# Patient Record
Sex: FEMALE | Race: WHITE | Employment: FULL TIME
[De-identification: names, ages, dates, MRNs, and addresses within clinical notes are randomized per-mention and may not be internally consistent; named-entity substitution may affect disease eponyms.]

---

## 2023-06-19 RX ORDER — ACETAMINOPHEN 160 MG
1 TABLET,DISINTEGRATING ORAL DAILY
COMMUNITY

## 2023-07-03 ENCOUNTER — HOSPITAL ENCOUNTER (OUTPATIENT)
Facility: HOSPITAL | Age: 63
Discharge: HOME | End: 2023-07-06
Payer: COMMERCIAL

## 2023-07-03 VITALS
DIASTOLIC BLOOD PRESSURE: 86 MMHG | HEART RATE: 92 BPM | SYSTOLIC BLOOD PRESSURE: 133 MMHG | TEMPERATURE: 98.1 F | RESPIRATION RATE: 18 BRPM | OXYGEN SATURATION: 99 %

## 2023-07-03 LAB
ABO + RH BLD: NORMAL
APPEARANCE UR: CLEAR
BACTERIA URNS QL MICRO: NEGATIVE /HPF
BILIRUB UR QL: NEGATIVE
BLOOD GROUP ANTIBODIES SERPL: NORMAL
COLOR UR: NORMAL
EPITH CASTS URNS QL MICRO: NORMAL /LPF
ERYTHROCYTE [DISTWIDTH] IN BLOOD BY AUTOMATED COUNT: 13.7 % (ref 11.5–14.5)
GLUCOSE UR STRIP.AUTO-MCNC: NEGATIVE MG/DL
HCT VFR BLD AUTO: 43.2 % (ref 35–47)
HGB BLD-MCNC: 14.2 G/DL (ref 11.5–16)
HGB UR QL STRIP: NEGATIVE
HYALINE CASTS URNS QL MICRO: NORMAL /LPF (ref 0–2)
KETONES UR QL STRIP.AUTO: NEGATIVE MG/DL
LEUKOCYTE ESTERASE UR QL STRIP.AUTO: NEGATIVE
MCH RBC QN AUTO: 26.3 PG (ref 26–34)
MCHC RBC AUTO-ENTMCNC: 32.9 G/DL (ref 30–36.5)
MCV RBC AUTO: 80 FL (ref 80–99)
NITRITE UR QL STRIP.AUTO: NEGATIVE
NRBC # BLD: 0 K/UL (ref 0–0.01)
NRBC BLD-RTO: 0 PER 100 WBC
PH UR STRIP: 5.5 (ref 5–8)
PLATELET # BLD AUTO: 275 K/UL (ref 150–400)
PMV BLD AUTO: 10.3 FL (ref 8.9–12.9)
PROT UR STRIP-MCNC: NEGATIVE MG/DL
RBC # BLD AUTO: 5.4 M/UL (ref 3.8–5.2)
RBC #/AREA URNS HPF: NORMAL /HPF (ref 0–5)
SP GR UR REFRACTOMETRY: 1.01
SPECIMEN EXP DATE BLD: NORMAL
URINE CULTURE IF INDICATED: NORMAL
UROBILINOGEN UR QL STRIP.AUTO: 0.2 EU/DL (ref 0.2–1)
WBC # BLD AUTO: 14.1 K/UL (ref 3.6–11)
WBC URNS QL MICRO: NORMAL /HPF (ref 0–4)

## 2023-07-03 PROCEDURE — 81001 URINALYSIS AUTO W/SCOPE: CPT

## 2023-07-03 PROCEDURE — 85027 COMPLETE CBC AUTOMATED: CPT

## 2023-07-03 PROCEDURE — 86900 BLOOD TYPING SEROLOGIC ABO: CPT

## 2023-07-03 PROCEDURE — 86901 BLOOD TYPING SEROLOGIC RH(D): CPT

## 2023-07-03 PROCEDURE — 86850 RBC ANTIBODY SCREEN: CPT

## 2023-07-03 PROCEDURE — 36415 COLL VENOUS BLD VENIPUNCTURE: CPT

## 2023-07-03 RX ORDER — ROSUVASTATIN CALCIUM 20 MG/1
20 TABLET, COATED ORAL DAILY
COMMUNITY

## 2023-07-03 ASSESSMENT — PAIN SCALES - GENERAL: PAINLEVEL_OUTOF10: 0

## 2023-07-03 NOTE — PERIOP NOTE
Pt. Chloe Teixeira in for PAT testing, brought medication list and updated Home med list.    Patient unable to give enough specimen for UA with Reflex Culture, provided oral fluids. 1205pm: Able to obtain urine specimen, sent to lab.

## 2023-07-05 NOTE — PERIOP NOTE
Call received from [facility] from Dr. Robin Reyes office and as per same,, Dr. Allyson Gibson said on her notes \" she will send urine  culture during surgery. \"

## 2023-07-06 ENCOUNTER — ANESTHESIA EVENT (OUTPATIENT)
Facility: HOSPITAL | Age: 63
End: 2023-07-06
Payer: COMMERCIAL

## 2023-07-07 ENCOUNTER — HOSPITAL ENCOUNTER (OUTPATIENT)
Facility: HOSPITAL | Age: 63
Discharge: HOME | End: 2023-07-07
Attending: OBSTETRICS & GYNECOLOGY | Admitting: OBSTETRICS & GYNECOLOGY
Payer: COMMERCIAL

## 2023-07-07 ENCOUNTER — ANESTHESIA (OUTPATIENT)
Facility: HOSPITAL | Age: 63
End: 2023-07-07
Payer: COMMERCIAL

## 2023-07-07 VITALS
DIASTOLIC BLOOD PRESSURE: 57 MMHG | TEMPERATURE: 97.2 F | BODY MASS INDEX: 33.82 KG/M2 | RESPIRATION RATE: 16 BRPM | HEIGHT: 65 IN | HEART RATE: 64 BPM | OXYGEN SATURATION: 98 % | WEIGHT: 203 LBS | SYSTOLIC BLOOD PRESSURE: 96 MMHG

## 2023-07-07 DIAGNOSIS — G89.18 POST-OP PAIN: Primary | ICD-10-CM

## 2023-07-07 PROBLEM — Z87.42 STATUS POST CERVICAL POLYP REMOVAL: Status: ACTIVE | Noted: 2023-07-07

## 2023-07-07 PROBLEM — D21.9 FIBROIDS: Status: ACTIVE | Noted: 2023-07-07

## 2023-07-07 PROBLEM — N95.0 POST-MENOPAUSAL BLEEDING: Status: RESOLVED | Noted: 2023-07-07 | Resolved: 2023-07-07

## 2023-07-07 PROBLEM — D21.9 FIBROIDS: Status: RESOLVED | Noted: 2023-07-07 | Resolved: 2023-07-07

## 2023-07-07 PROBLEM — Z87.42 STATUS POST CERVICAL POLYP REMOVAL: Status: RESOLVED | Noted: 2023-07-07 | Resolved: 2023-07-07

## 2023-07-07 PROBLEM — Z98.890 STATUS POST CERVICAL POLYP REMOVAL: Status: RESOLVED | Noted: 2023-07-07 | Resolved: 2023-07-07

## 2023-07-07 PROBLEM — Z98.890 STATUS POST CERVICAL POLYP REMOVAL: Status: ACTIVE | Noted: 2023-07-07

## 2023-07-07 PROBLEM — N95.0 POST-MENOPAUSAL BLEEDING: Status: ACTIVE | Noted: 2023-07-07

## 2023-07-07 PROBLEM — R32 URINARY INCONTINENCE: Status: ACTIVE | Noted: 2023-07-07

## 2023-07-07 PROCEDURE — 3600000004 HC SURGERY LEVEL 4 BASE: Performed by: OBSTETRICS & GYNECOLOGY

## 2023-07-07 PROCEDURE — 2580000003 HC RX 258: Performed by: ANESTHESIOLOGY

## 2023-07-07 PROCEDURE — 87086 URINE CULTURE/COLONY COUNT: CPT

## 2023-07-07 PROCEDURE — 7100000000 HC PACU RECOVERY - FIRST 15 MIN: Performed by: OBSTETRICS & GYNECOLOGY

## 2023-07-07 PROCEDURE — 3700000000 HC ANESTHESIA ATTENDED CARE: Performed by: OBSTETRICS & GYNECOLOGY

## 2023-07-07 PROCEDURE — 7100000010 HC PHASE II RECOVERY - FIRST 15 MIN: Performed by: OBSTETRICS & GYNECOLOGY

## 2023-07-07 PROCEDURE — 2720000010 HC SURG SUPPLY STERILE: Performed by: OBSTETRICS & GYNECOLOGY

## 2023-07-07 PROCEDURE — 6360000002 HC RX W HCPCS: Performed by: ANESTHESIOLOGY

## 2023-07-07 PROCEDURE — 7100000001 HC PACU RECOVERY - ADDTL 15 MIN: Performed by: OBSTETRICS & GYNECOLOGY

## 2023-07-07 PROCEDURE — 88305 TISSUE EXAM BY PATHOLOGIST: CPT

## 2023-07-07 PROCEDURE — 7100000011 HC PHASE II RECOVERY - ADDTL 15 MIN: Performed by: OBSTETRICS & GYNECOLOGY

## 2023-07-07 PROCEDURE — 2709999900 HC NON-CHARGEABLE SUPPLY: Performed by: OBSTETRICS & GYNECOLOGY

## 2023-07-07 PROCEDURE — 6360000002 HC RX W HCPCS

## 2023-07-07 PROCEDURE — 6360000002 HC RX W HCPCS: Performed by: OBSTETRICS & GYNECOLOGY

## 2023-07-07 PROCEDURE — 3600000014 HC SURGERY LEVEL 4 ADDTL 15MIN: Performed by: OBSTETRICS & GYNECOLOGY

## 2023-07-07 PROCEDURE — 6370000000 HC RX 637 (ALT 250 FOR IP)

## 2023-07-07 PROCEDURE — 3700000001 HC ADD 15 MINUTES (ANESTHESIA): Performed by: OBSTETRICS & GYNECOLOGY

## 2023-07-07 PROCEDURE — 6360000002 HC RX W HCPCS: Performed by: NURSE ANESTHETIST, CERTIFIED REGISTERED

## 2023-07-07 PROCEDURE — P9045 ALBUMIN (HUMAN), 5%, 250 ML: HCPCS

## 2023-07-07 PROCEDURE — 2500000003 HC RX 250 WO HCPCS: Performed by: NURSE ANESTHETIST, CERTIFIED REGISTERED

## 2023-07-07 PROCEDURE — 2580000003 HC RX 258: Performed by: OBSTETRICS & GYNECOLOGY

## 2023-07-07 PROCEDURE — 88331 PATH CONSLTJ SURG 1 BLK 1SPC: CPT

## 2023-07-07 PROCEDURE — 88342 IMHCHEM/IMCYTCHM 1ST ANTB: CPT

## 2023-07-07 PROCEDURE — 88307 TISSUE EXAM BY PATHOLOGIST: CPT

## 2023-07-07 PROCEDURE — 6370000000 HC RX 637 (ALT 250 FOR IP): Performed by: OBSTETRICS & GYNECOLOGY

## 2023-07-07 RX ORDER — LIDOCAINE HYDROCHLORIDE 20 MG/ML
INJECTION, SOLUTION EPIDURAL; INFILTRATION; INTRACAUDAL; PERINEURAL PRN
Status: DISCONTINUED | OUTPATIENT
Start: 2023-07-07 | End: 2023-07-07 | Stop reason: SDUPTHER

## 2023-07-07 RX ORDER — CEFAZOLIN SODIUM 1 G/3ML
INJECTION, POWDER, FOR SOLUTION INTRAMUSCULAR; INTRAVENOUS
Status: DISCONTINUED
Start: 2023-07-07 | End: 2023-07-07 | Stop reason: HOSPADM

## 2023-07-07 RX ORDER — HYDROMORPHONE HCL 110MG/55ML
PATIENT CONTROLLED ANALGESIA SYRINGE INTRAVENOUS PRN
Status: DISCONTINUED | OUTPATIENT
Start: 2023-07-07 | End: 2023-07-07 | Stop reason: SDUPTHER

## 2023-07-07 RX ORDER — METRONIDAZOLE 500 MG/100ML
500 INJECTION, SOLUTION INTRAVENOUS ONCE
Status: COMPLETED | OUTPATIENT
Start: 2023-07-07 | End: 2023-07-07

## 2023-07-07 RX ORDER — ALBUMIN, HUMAN INJ 5% 5 %
12.5 SOLUTION INTRAVENOUS ONCE
Status: COMPLETED | OUTPATIENT
Start: 2023-07-07 | End: 2023-07-07

## 2023-07-07 RX ORDER — DROPERIDOL 2.5 MG/ML
0.62 INJECTION, SOLUTION INTRAMUSCULAR; INTRAVENOUS
Status: DISCONTINUED | OUTPATIENT
Start: 2023-07-07 | End: 2023-07-07 | Stop reason: HOSPADM

## 2023-07-07 RX ORDER — MEPERIDINE HYDROCHLORIDE 25 MG/ML
12.5 INJECTION INTRAMUSCULAR; INTRAVENOUS; SUBCUTANEOUS EVERY 5 MIN PRN
Status: DISCONTINUED | OUTPATIENT
Start: 2023-07-07 | End: 2023-07-07 | Stop reason: HOSPADM

## 2023-07-07 RX ORDER — METRONIDAZOLE 500 MG/100ML
INJECTION, SOLUTION INTRAVENOUS
Status: COMPLETED
Start: 2023-07-07 | End: 2023-07-07

## 2023-07-07 RX ORDER — KETOROLAC TROMETHAMINE 30 MG/ML
30 INJECTION, SOLUTION INTRAMUSCULAR; INTRAVENOUS
Status: COMPLETED | OUTPATIENT
Start: 2023-07-07 | End: 2023-07-07

## 2023-07-07 RX ORDER — ACETAMINOPHEN 500 MG
1000 TABLET ORAL ONCE
Status: COMPLETED | OUTPATIENT
Start: 2023-07-07 | End: 2023-07-07

## 2023-07-07 RX ORDER — OXYCODONE HYDROCHLORIDE 5 MG/1
5 TABLET ORAL
Status: DISCONTINUED | OUTPATIENT
Start: 2023-07-07 | End: 2023-07-07 | Stop reason: HOSPADM

## 2023-07-07 RX ORDER — FENTANYL CITRATE 50 UG/ML
25 INJECTION, SOLUTION INTRAMUSCULAR; INTRAVENOUS EVERY 5 MIN PRN
Status: DISCONTINUED | OUTPATIENT
Start: 2023-07-07 | End: 2023-07-07 | Stop reason: HOSPADM

## 2023-07-07 RX ORDER — WATER 1000 ML/1000ML
INJECTION, SOLUTION INTRAVENOUS
Status: DISCONTINUED
Start: 2023-07-07 | End: 2023-07-07 | Stop reason: HOSPADM

## 2023-07-07 RX ORDER — HYDROMORPHONE HYDROCHLORIDE 1 MG/ML
0.5 INJECTION, SOLUTION INTRAMUSCULAR; INTRAVENOUS; SUBCUTANEOUS EVERY 5 MIN PRN
Status: DISCONTINUED | OUTPATIENT
Start: 2023-07-07 | End: 2023-07-07 | Stop reason: HOSPADM

## 2023-07-07 RX ORDER — ALBUMIN, HUMAN INJ 5% 5 %
SOLUTION INTRAVENOUS
Status: COMPLETED
Start: 2023-07-07 | End: 2023-07-07

## 2023-07-07 RX ORDER — ACETAMINOPHEN 325 MG/1
650 TABLET ORAL EVERY 4 HOURS PRN
Qty: 30 TABLET | Refills: 1 | Status: SHIPPED | OUTPATIENT
Start: 2023-07-07

## 2023-07-07 RX ORDER — PHENYLEPHRINE HCL IN 0.9% NACL 0.4MG/10ML
SYRINGE (ML) INTRAVENOUS PRN
Status: DISCONTINUED | OUTPATIENT
Start: 2023-07-07 | End: 2023-07-07 | Stop reason: SDUPTHER

## 2023-07-07 RX ORDER — OXYCODONE HYDROCHLORIDE 5 MG/1
5 TABLET ORAL EVERY 6 HOURS PRN
Qty: 12 TABLET | Refills: 0 | Status: SHIPPED | OUTPATIENT
Start: 2023-07-07 | End: 2023-07-10

## 2023-07-07 RX ORDER — DEXAMETHASONE SODIUM PHOSPHATE 4 MG/ML
INJECTION, SOLUTION INTRA-ARTICULAR; INTRALESIONAL; INTRAMUSCULAR; INTRAVENOUS; SOFT TISSUE PRN
Status: DISCONTINUED | OUTPATIENT
Start: 2023-07-07 | End: 2023-07-07 | Stop reason: SDUPTHER

## 2023-07-07 RX ORDER — SODIUM CHLORIDE 0.9 % (FLUSH) 0.9 %
5-40 SYRINGE (ML) INJECTION PRN
Status: DISCONTINUED | OUTPATIENT
Start: 2023-07-07 | End: 2023-07-07 | Stop reason: HOSPADM

## 2023-07-07 RX ORDER — IBUPROFEN 600 MG/1
600 TABLET ORAL 3 TIMES DAILY PRN
Qty: 90 TABLET | Refills: 0 | Status: SHIPPED | OUTPATIENT
Start: 2023-07-07

## 2023-07-07 RX ORDER — DIPHENHYDRAMINE HYDROCHLORIDE 50 MG/ML
12.5 INJECTION INTRAMUSCULAR; INTRAVENOUS
Status: DISCONTINUED | OUTPATIENT
Start: 2023-07-07 | End: 2023-07-07 | Stop reason: HOSPADM

## 2023-07-07 RX ORDER — EPHEDRINE SULFATE/0.9% NACL/PF 50 MG/5 ML
SYRINGE (ML) INTRAVENOUS PRN
Status: DISCONTINUED | OUTPATIENT
Start: 2023-07-07 | End: 2023-07-07 | Stop reason: SDUPTHER

## 2023-07-07 RX ORDER — LIDOCAINE HYDROCHLORIDE 10 MG/ML
1 INJECTION, SOLUTION EPIDURAL; INFILTRATION; INTRACAUDAL; PERINEURAL
Status: DISCONTINUED | OUTPATIENT
Start: 2023-07-07 | End: 2023-07-07 | Stop reason: HOSPADM

## 2023-07-07 RX ORDER — MIDAZOLAM HYDROCHLORIDE 1 MG/ML
INJECTION INTRAMUSCULAR; INTRAVENOUS PRN
Status: DISCONTINUED | OUTPATIENT
Start: 2023-07-07 | End: 2023-07-07 | Stop reason: SDUPTHER

## 2023-07-07 RX ORDER — SUCCINYLCHOLINE CHLORIDE 20 MG/ML
INJECTION INTRAMUSCULAR; INTRAVENOUS PRN
Status: DISCONTINUED | OUTPATIENT
Start: 2023-07-07 | End: 2023-07-07 | Stop reason: SDUPTHER

## 2023-07-07 RX ORDER — SODIUM CHLORIDE 9 MG/ML
INJECTION, SOLUTION INTRAVENOUS PRN
Status: DISCONTINUED | OUTPATIENT
Start: 2023-07-07 | End: 2023-07-07 | Stop reason: HOSPADM

## 2023-07-07 RX ORDER — BUPIVACAINE HYDROCHLORIDE 2.5 MG/ML
INJECTION, SOLUTION EPIDURAL; INFILTRATION; INTRACAUDAL PRN
Status: DISCONTINUED | OUTPATIENT
Start: 2023-07-07 | End: 2023-07-07 | Stop reason: ALTCHOICE

## 2023-07-07 RX ORDER — SODIUM CHLORIDE, SODIUM LACTATE, POTASSIUM CHLORIDE, CALCIUM CHLORIDE 600; 310; 30; 20 MG/100ML; MG/100ML; MG/100ML; MG/100ML
INJECTION, SOLUTION INTRAVENOUS CONTINUOUS
Status: DISCONTINUED | OUTPATIENT
Start: 2023-07-07 | End: 2023-07-07 | Stop reason: HOSPADM

## 2023-07-07 RX ORDER — ACETAMINOPHEN 500 MG
TABLET ORAL
Status: COMPLETED
Start: 2023-07-07 | End: 2023-07-07

## 2023-07-07 RX ORDER — FENTANYL CITRATE 50 UG/ML
INJECTION, SOLUTION INTRAMUSCULAR; INTRAVENOUS PRN
Status: DISCONTINUED | OUTPATIENT
Start: 2023-07-07 | End: 2023-07-07 | Stop reason: SDUPTHER

## 2023-07-07 RX ORDER — ROCURONIUM BROMIDE 10 MG/ML
INJECTION, SOLUTION INTRAVENOUS PRN
Status: DISCONTINUED | OUTPATIENT
Start: 2023-07-07 | End: 2023-07-07 | Stop reason: SDUPTHER

## 2023-07-07 RX ORDER — ONDANSETRON 2 MG/ML
INJECTION INTRAMUSCULAR; INTRAVENOUS PRN
Status: DISCONTINUED | OUTPATIENT
Start: 2023-07-07 | End: 2023-07-07 | Stop reason: SDUPTHER

## 2023-07-07 RX ORDER — DROPERIDOL 2.5 MG/ML
0.62 INJECTION, SOLUTION INTRAMUSCULAR; INTRAVENOUS
Status: COMPLETED | OUTPATIENT
Start: 2023-07-07 | End: 2023-07-07

## 2023-07-07 RX ORDER — ACETAMINOPHEN 325 MG/1
650 TABLET ORAL EVERY 4 HOURS PRN
Status: DISCONTINUED | OUTPATIENT
Start: 2023-07-07 | End: 2023-07-07 | Stop reason: HOSPADM

## 2023-07-07 RX ORDER — GLYCOPYRROLATE 0.2 MG/ML
INJECTION INTRAMUSCULAR; INTRAVENOUS PRN
Status: DISCONTINUED | OUTPATIENT
Start: 2023-07-07 | End: 2023-07-07 | Stop reason: SDUPTHER

## 2023-07-07 RX ADMIN — KETOROLAC TROMETHAMINE 30 MG: 30 INJECTION, SOLUTION INTRAMUSCULAR; INTRAVENOUS at 11:40

## 2023-07-07 RX ADMIN — HYDROMORPHONE HYDROCHLORIDE 0.2 MG: 2 INJECTION INTRAMUSCULAR; INTRAVENOUS; SUBCUTANEOUS at 11:07

## 2023-07-07 RX ADMIN — MIDAZOLAM HYDROCHLORIDE 2 MG: 1 INJECTION, SOLUTION INTRAMUSCULAR; INTRAVENOUS at 07:57

## 2023-07-07 RX ADMIN — SUGAMMADEX 200 MG: 100 INJECTION, SOLUTION INTRAVENOUS at 11:00

## 2023-07-07 RX ADMIN — LIDOCAINE HYDROCHLORIDE 100 MG: 20 INJECTION, SOLUTION EPIDURAL; INFILTRATION; INTRACAUDAL; PERINEURAL at 08:04

## 2023-07-07 RX ADMIN — DROPERIDOL 0.62 MG: 2.5 INJECTION, SOLUTION INTRAMUSCULAR; INTRAVENOUS at 13:01

## 2023-07-07 RX ADMIN — Medication 100 MCG: at 08:20

## 2023-07-07 RX ADMIN — Medication 1000 MG: at 07:30

## 2023-07-07 RX ADMIN — ACETAMINOPHEN 1000 MG: 500 TABLET ORAL at 07:30

## 2023-07-07 RX ADMIN — HYDROMORPHONE HYDROCHLORIDE 0.4 MG: 2 INJECTION INTRAMUSCULAR; INTRAVENOUS; SUBCUTANEOUS at 09:55

## 2023-07-07 RX ADMIN — Medication 80 MCG: at 08:31

## 2023-07-07 RX ADMIN — GLYCOPYRROLATE 0.2 MG: 0.2 INJECTION, SOLUTION INTRAMUSCULAR; INTRAVENOUS at 09:56

## 2023-07-07 RX ADMIN — Medication 40 MCG: at 10:27

## 2023-07-07 RX ADMIN — ACETAMINOPHEN 650 MG: 325 TABLET ORAL at 14:27

## 2023-07-07 RX ADMIN — ROCURONIUM BROMIDE 10 MG: 10 INJECTION INTRAVENOUS at 09:59

## 2023-07-07 RX ADMIN — Medication 10 MG: at 09:08

## 2023-07-07 RX ADMIN — HYDROMORPHONE HYDROCHLORIDE 0.6 MG: 2 INJECTION INTRAMUSCULAR; INTRAVENOUS; SUBCUTANEOUS at 09:18

## 2023-07-07 RX ADMIN — ONDANSETRON HYDROCHLORIDE 4 MG: 2 INJECTION, SOLUTION INTRAMUSCULAR; INTRAVENOUS at 10:50

## 2023-07-07 RX ADMIN — PROPOFOL 150 MG: 10 INJECTION, EMULSION INTRAVENOUS at 08:04

## 2023-07-07 RX ADMIN — FENTANYL CITRATE 25 MCG: 0.05 INJECTION, SOLUTION INTRAMUSCULAR; INTRAVENOUS at 12:05

## 2023-07-07 RX ADMIN — ROCURONIUM BROMIDE 30 MG: 10 INJECTION INTRAVENOUS at 08:11

## 2023-07-07 RX ADMIN — SODIUM CHLORIDE, POTASSIUM CHLORIDE, SODIUM LACTATE AND CALCIUM CHLORIDE: 600; 310; 30; 20 INJECTION, SOLUTION INTRAVENOUS at 07:33

## 2023-07-07 RX ADMIN — Medication 80 MCG: at 09:21

## 2023-07-07 RX ADMIN — ROCURONIUM BROMIDE 10 MG: 10 INJECTION INTRAVENOUS at 08:45

## 2023-07-07 RX ADMIN — ROCURONIUM BROMIDE 10 MG: 10 INJECTION INTRAVENOUS at 08:04

## 2023-07-07 RX ADMIN — ROCURONIUM BROMIDE 10 MG: 10 INJECTION INTRAVENOUS at 10:10

## 2023-07-07 RX ADMIN — FENTANYL CITRATE 100 MCG: 50 INJECTION, SOLUTION INTRAMUSCULAR; INTRAVENOUS at 08:04

## 2023-07-07 RX ADMIN — METRONIDAZOLE 500 MG: 500 INJECTION, SOLUTION INTRAVENOUS at 08:09

## 2023-07-07 RX ADMIN — SUCCINYLCHOLINE CHLORIDE 140 MG: 20 INJECTION, SOLUTION INTRAMUSCULAR; INTRAVENOUS at 08:05

## 2023-07-07 RX ADMIN — DEXAMETHASONE SODIUM PHOSPHATE 8 MG: 4 INJECTION, SOLUTION INTRAMUSCULAR; INTRAVENOUS at 08:25

## 2023-07-07 RX ADMIN — ALBUMIN, HUMAN INJ 5% 12.5 G: 5 SOLUTION at 13:25

## 2023-07-07 RX ADMIN — ROCURONIUM BROMIDE 20 MG: 10 INJECTION INTRAVENOUS at 09:11

## 2023-07-07 RX ADMIN — Medication 15 MG: at 08:11

## 2023-07-07 RX ADMIN — SODIUM CHLORIDE, POTASSIUM CHLORIDE, SODIUM LACTATE AND CALCIUM CHLORIDE: 600; 310; 30; 20 INJECTION, SOLUTION INTRAVENOUS at 11:15

## 2023-07-07 RX ADMIN — WATER 2000 MG: 1 INJECTION INTRAMUSCULAR; INTRAVENOUS; SUBCUTANEOUS at 08:00

## 2023-07-07 RX ADMIN — Medication 40 MCG: at 10:48

## 2023-07-07 RX ADMIN — SODIUM CHLORIDE, POTASSIUM CHLORIDE, SODIUM LACTATE AND CALCIUM CHLORIDE: 600; 310; 30; 20 INJECTION, SOLUTION INTRAVENOUS at 09:22

## 2023-07-07 RX ADMIN — ALBUMIN (HUMAN) 12.5 G: 12.5 INJECTION, SOLUTION INTRAVENOUS at 13:25

## 2023-07-07 RX ADMIN — Medication 15 MG: at 08:05

## 2023-07-07 RX ADMIN — FENTANYL CITRATE 25 MCG: 0.05 INJECTION, SOLUTION INTRAMUSCULAR; INTRAVENOUS at 12:00

## 2023-07-07 RX ADMIN — Medication 100 MCG: at 08:14

## 2023-07-07 ASSESSMENT — PAIN - FUNCTIONAL ASSESSMENT: PAIN_FUNCTIONAL_ASSESSMENT: NONE - DENIES PAIN

## 2023-07-07 ASSESSMENT — PAIN SCALES - GENERAL
PAINLEVEL_OUTOF10: 4
PAINLEVEL_OUTOF10: 4
PAINLEVEL_OUTOF10: 5
PAINLEVEL_OUTOF10: 4
PAINLEVEL_OUTOF10: 5
PAINLEVEL_OUTOF10: 2
PAINLEVEL_OUTOF10: 5
PAINLEVEL_OUTOF10: 4
PAINLEVEL_OUTOF10: 2
PAINLEVEL_OUTOF10: 5
PAINLEVEL_OUTOF10: 4
PAINLEVEL_OUTOF10: 5

## 2023-07-07 ASSESSMENT — PAIN DESCRIPTION - LOCATION
LOCATION: ABDOMEN
LOCATION: ABDOMEN

## 2023-07-07 ASSESSMENT — PAIN DESCRIPTION - ORIENTATION
ORIENTATION: ANTERIOR;LOWER
ORIENTATION: ANTERIOR;LOWER

## 2023-07-07 ASSESSMENT — PAIN DESCRIPTION - DESCRIPTORS
DESCRIPTORS: PRESSURE
DESCRIPTORS: PRESSURE

## 2023-07-07 NOTE — ANESTHESIA POSTPROCEDURE EVALUATION
Department of Anesthesiology  Postprocedure Note    Patient: Pastor Settler  MRN: [id number]  YOB: 1960  Date of evaluation: 7/7/2023      Procedure Summary     Date: 07/07/23 Room / Location: [facility] ASU B2 / [facility] AMBULATORY OR    Anesthesia Start: 8693 Anesthesia Stop: 7100    Procedure: TOTAL LAPAROSCOPIC HYSTERECTOMY WITH BILATERAL SALPINGO-OOPHORECTOMY, CYSTOSCOPY, DIALATION AND CURETTAGE WITH FROZEN SECTION (Abdomen/Perineum) Diagnosis:       Uterine leiomyoma, unspecified location      (Uterine leiomyoma, unspecified location [D25.9])    Surgeons: Shahriar Phillips MD Responsible Provider: Brooke Lam MD    Anesthesia Type: general ASA Status: 2          Anesthesia Type: No value filed. Angeles Phase I: Angeles Score: 9    Angeles Phase II: Angeles Score: 10      Anesthesia Post Evaluation    Patient location during evaluation: PACU  Patient participation: complete - patient participated  Level of consciousness: awake and alert  Pain score: 5  Airway patency: patent  Nausea & Vomiting: nausea and no vomiting  Complications: no  Cardiovascular status: hemodynamically stable  Respiratory status: acceptable  Hydration status: euvolemic  Comments: Pt had some nausea when getting up postop; BP in the low side (SBP 90's).  Treated with IV hydration, 250 ml Albumin, and droperidol  Multimodal analgesia pain management approach

## 2023-07-07 NOTE — OP NOTE
Operative Note    Patient ID:   Name: Roxane Vicente    Medical Record Number: [id number]    YOB: 1960    Preoperative Diagnosis: Uterine leiomyoma, unspecified location [D25.9] multiple fibrods including submucosal 4-5 cm size. PMB    Postoperative Diagnosis same  - Endometrial curetting - benign and atrophic   Surgeon:  Master Donovan MD     Assistant:  Chris Nixon     Anesthesia: General    Procedure: Total Laparoscopic Hysterectomy with Bilateral salpingoophorectomy more than 250 grams, cystoscopy D&C with frozen section    Findings: enlarged uterus and normal BSO    Estimated Blood Loss: less than 50     Drains: none    Pathology /Specimens:     ID Type Source Tests Collected by Time Destination   1 : Endometrial Curettings Tissue Endometrium SURGICAL PATHOLOGY Master Donovan MD 7/7/2023 9019    2 : Urine Culture Urine Urine, indwelling catheter CULTURE, URINE Master Donovan MD 7/7/2023 0848    3 : Uterus, Cervix, Fibroid, Bilateral Fallopian Tubes and Ovaries Tissue Uterus SURGICAL PATHOLOGY Master Donovan MD 7/7/2023 1030        DVT Prophylaxis: SCD Hose    Antibiotic Prophylaxis: Flagyl and Ancef:    The patient was taken to the operating room with intravenous fluids running, where she was administered general anesthesia in the supine position. Steele was placed in the bladder using sterile techniques. She was then placed in the dorsal lithotomy position and prepped and draped in usual sterile fashion. A VCare uterine manipulator is placed and the balloon inflated with 3 cc sterile saline. Gloves are changed and attention is turned to the abdomen. An umbilical incision is made, and access is gained using the optiview technique with a 5 mm trocar. Pneumoperitoneum is obtained and intraabdominal placement is verified. There was no bleeding and no evidence of injury to the bowel.  5 mm incisions were then made in the left and right lower quadrants and 5 mm ports placed in each of those under

## 2023-07-07 NOTE — DISCHARGE SUMMARY
Gynecology Discharge Summary     Patient ID:  Beth Buerger  [id number]  53 y.o.  1960    Admit date: 7/7/2023    Discharge date: 7/7/2023     Admission Diagnoses:   Patient Active Problem List   Diagnosis    Urinary incontinence       Discharge Diagnoses: No discharge information exists for this patient. Patient Active Problem List   Diagnosis    Urinary incontinence       Procedures for this admission: Procedure(s):  TOTAL LAPAROSCOPIC HYSTERECTOMY WITH BILATERAL SALPINGO-OOPHORECTOMY, CYSTOSCOPY, DIALATION AND CURETTAGE WITH FROZEN SECTION    Hospital Course:    Disposition: home    Discharged Condition: stable            Patient Instructions:   Current Discharge Medication List        CONTINUE these medications which have NOT CHANGED    Details   Cholecalciferol (VITAMIN D3) 50 MCG (2000 UT) CAPS Take 1 capsule by mouth daily      rosuvastatin (CRESTOR) 20 MG tablet Take 1 tablet by mouth daily           Activity: NO SEX x 6 weeks, no deep bending or squat . Expect light bleeding in 10 days to 14 days   Diet: regular diet  Wound Care: keep wound clean and dry    Follow-up with [facility] provider in 2 weeks then 4 weeks.     Signed:  Tamika Rose MD  7/7/2023  11:06 AM

## 2023-07-07 NOTE — PERIOP NOTE
Preethi Franco  1960  [id number]    Situation:  Verbal report given from: SOFIA aMrinelli RN  Procedure: Procedure(s):  TOTAL LAPAROSCOPIC HYSTERECTOMY WITH BILATERAL SALPINGO-OOPHORECTOMY, CYSTOSCOPY, DIALATION AND CURETTAGE WITH FROZEN SECTION    Background:    Preoperative diagnosis: Uterine leiomyoma, unspecified location [D25.9]    Postoperative diagnosis: * No post-op diagnosis entered *    :  Dr. Maite Carrillo    Assistant(s): Circulator: Dia Wood RN  Surgical Assistant: Kate Larson  Scrub Person First: Sunita Umana    Specimens:   ID Type Source Tests Collected by Time Destination   1 : Endometrial Curettings Tissue Endometrium SURGICAL PATHOLOGY Omeag Davis MD 7/7/2023 4412    2 : Urine Culture Urine Urine, indwelling catheter CULTURE, URINE Omega Davis MD 7/7/2023 0867    3 : Uterus, Cervix, Fibroid, Bilateral Fallopian Tubes and Ovaries Tissue Uterus SURGICAL PATHOLOGY Omega Davis MD 7/7/2023 1030        Assessment:  Intra-procedure medications         Anesthesia gave intra-procedure sedation and medications, see anesthesia flow sheet     Intravenous fluids: LR@ KVO     Vital signs stable       Recommendation:

## 2023-07-07 NOTE — DISCHARGE INSTRUCTIONS
whenever your medications are discontinued, doses are      changed, or new medications (including over-the-counter products) are added. *  Please carry medication information at all times in case of emergency situations. If you have not received your influenza and/or pneumococcal vaccine, please follow up with your primary care physician. The discharge information has been reviewed with the patient and caregiver. The patient and caregiver verbalized understanding. [address] THROMBOSIS AND PULMONARY EMBOLUS    SURGICAL PATIENTS  Surgical patients are the #1 risk for DVT and PE. WHAT IS DVT? WHAT IS PE?  DVT is a serious condition where blood clots develop deep in the veins of the legs. PE occurs when a blood clot breaks loose from the wall of a vein and travels to the lungs blocking the pulmonary artery or one of its branches impairing blood flow from the heart, which could result in death.   RISK FACTORS  Surgery lasting longer than 45 minutes  History of inflammatory bowel disease  Oral contraceptive or hormone replacement therapy  Immobilization  Varicose veins / swollen legs  Smoking   CHF / Acute MI / Irregular heart beat  Family history of thrombosis  General anesthesia greater than 2 hours  Obesity  Infection of less than one month  Less than 1 month postpartum  COPD / Pneumonia  Arthroscopic surgery  Malignancy / cancer  Spine surgery  Blood abnormalities  Stroke / Paralysis / Coma    SIGNS AND SYMPTOMS OF DEEP VEIN TROMBOSIS   -  ER VISIT  Usually occurs in one leg, above or below the knee  Swelling - one calf or thigh may be larger than the other  Feeling increased warmth in the area of the leg that is swollen or painful  Leg pain, which may increase when standing or walking  Swelling along the vein of the leg  When swollen areas is pressed with a finger, a depression may remain  Tenderness of the leg that may be confined to one area  Change in leg color

## 2023-07-07 NOTE — H&P
Gynecology History and Physical    Name: Giovanny Clemens MRN: [id number] SSN: [id number]    YOB: 1960  Age: 59 y.o. Sex: female       Subjective:      Chief complaint:  Fibroids and Postmenopausal bleeding    Wolfgang Mccarthy is a 59 y.o.  female with a history of fibroids and postmenopausal bleeding. Ultrasound include fibroids and endometrium could not be seen due to multiple fibroids. She had a cervical polyp 5x4 cm which was removed in the office and had a benign pathology. She has HENCE had no post menopausal bleeding . Previous treatment measures include none. Fibroids are multiple and range from 3-5 cm in size  She is admitted for Procedure(s) (LRB):  TOTAL LAPAROSCOPIC HYSTERECTOMY WITH BILATERAL SALPINGO-OOPHORECTOMY, CYSTOSCOPY, DIALATION AND CURETTAGE WITH FROZEN SECTION (N/A). The current method of family planning is post menopausal status. Past Medical History:   Diagnosis Date    Uterine fibroid      Past Surgical History:   Procedure Laterality Date    BUNIONECTOMY Right 1977    CARPAL TUNNEL RELEASE Bilateral     COLONOSCOPY      DILATION AND CURETTAGE OF UTERUS      FINGER TRIGGER RELEASE Right     thumb    WISDOM TOOTH EXTRACTION       No Known Allergies  Prior to Admission medications    Medication Sig Start Date End Date Taking? Authorizing Provider   Cholecalciferol (VITAMIN D3) 50 MCG (2000 UT) CAPS Take 1 capsule by mouth daily   Yes Historical Provider, MD   rosuvastatin (CRESTOR) 20 MG tablet Take 1 tablet by mouth daily    Historical Provider, MD      History reviewed. No pertinent family history. OB History    No obstetric history on file.        Social History     Socioeconomic History    Marital status:      Spouse name: Not on file    Number of children: Not on file    Years of education: Not on file    Highest education level: Not on file   Occupational History    Not on file   Tobacco Use    Smoking status: Never     Passive exposure: Never    Smokeless

## 2023-07-07 NOTE — PERIOP NOTE
Permission received to review discharge instructions and discuss private health information with  Cody. Patient states family/friend will be with them for 24 hours following procedure. 6595: Order from Dr. Alejo Stone for Flagyl 500mg IV for surgical prophylaxis, in addition to Ancef 2g. Order entered.

## 2023-07-07 NOTE — PERIOP NOTE
Received care of pt from JULIAN Landrum RN. Pt's BP has been on low side 91/52 to 96/54. Has received 2850cc LR and Dr. Gomez Ip ordered 250ml of Albumin.  Infusing and pt resting and /56 HR 60 and 96%

## 2023-07-08 LAB
BACTERIA SPEC CULT: NORMAL
SERVICE CMNT-IMP: NORMAL

## 2023-07-25 NOTE — PROGRESS NOTES
[address] [place] [address] Mary Toscano  P [phone]  F [phone]    Office Note  Patient ID:  Name:  Abelino Murphy  MRN:  [id number]  :  1960/63 y.o. Date:  2023      HISTORY OF PRESENT ILLNESS:  Abelino Murphy is a 59 y.o.  postmenopausal female who is a new patient being seen for an occult STIC identified following a hysterectomy for presumed benign indications. She underwent TLH, BSO with Dr. Oliver Villeda with [facility] on 23 for postmenopausal bleeding and fibroids. She had previously had a cervical polyp removed with benign histology. 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 FINAL PATHOLOGIC DIAGNOSIS   1. Endometrium, curetting:   Scant fragments of benign glandular epithelium   2. Uterus, bilateral ovaries and fallopian tubes, hysterectomy/bilateral salpingo-oophorectomy:   Cervix: Focal vasculitis. See comment   Endometrium: Cystic atrophy   Myometrium: Leiomyomas and adenomyosis   Serosa: Fibrous adhesions   Ovaries, bilateral: Serous cystadenofibroma and physiologic changes   Fallopian tubes, bilateral: Focal serous tubal intraepithelial carcinoma (STIC). See comment   Comment   Representative sections of fallopian tubes demonstrate a focus of significant cytologic atypia which is positive for p53 overexpression. Features are consistent with a focus of serous tubal intraepithelial carcinoma. The remainder of the fallopian tube tissue will be submitted for histologic evaluation. Results of these additional sections will be reported in an addendum   Regarding the cervix, focal vasculitis may be an isolated nonspecific finding in the GYN tract or may be part of a systemic vasculitis.  Clinical correlation is necessary   Addendum Diagnosis   No additional lesions or atypia identified in the additional sections of fallopian tube   Addendum Comment

## 2023-07-26 ENCOUNTER — TELEPHONE (OUTPATIENT)
Age: 63
End: 2023-07-26

## 2023-07-26 ENCOUNTER — OFFICE VISIT (OUTPATIENT)
Age: 63
End: 2023-07-26
Payer: COMMERCIAL

## 2023-07-26 VITALS
DIASTOLIC BLOOD PRESSURE: 83 MMHG | HEIGHT: 65 IN | BODY MASS INDEX: 33.62 KG/M2 | WEIGHT: 201.8 LBS | HEART RATE: 79 BPM | SYSTOLIC BLOOD PRESSURE: 131 MMHG

## 2023-07-26 DIAGNOSIS — C57.00 FALLOPIAN TUBE CARCINOMA, UNSPECIFIED LATERALITY (HCC): Primary | ICD-10-CM

## 2023-07-26 PROCEDURE — 99204 OFFICE O/P NEW MOD 45 MIN: CPT | Performed by: OBSTETRICS & GYNECOLOGY

## 2023-07-28 LAB — CANCER AG125 SERPL-ACNC: 27.4 U/ML (ref 0–38.1)

## 2023-08-01 ENCOUNTER — HOSPITAL ENCOUNTER (OUTPATIENT)
Age: 63
Discharge: HOME | End: 2023-08-04
Payer: COMMERCIAL

## 2023-08-01 DIAGNOSIS — C57.00 FALLOPIAN TUBE CARCINOMA, UNSPECIFIED LATERALITY (HCC): ICD-10-CM

## 2023-08-01 PROCEDURE — 2500000003 HC RX 250 WO HCPCS: Performed by: RADIOLOGY

## 2023-08-01 PROCEDURE — 6360000004 HC RX CONTRAST MEDICATION: Performed by: RADIOLOGY

## 2023-08-01 PROCEDURE — 74177 CT ABD & PELVIS W/CONTRAST: CPT

## 2023-08-01 RX ADMIN — IOPAMIDOL 100 ML: 755 INJECTION, SOLUTION INTRAVENOUS at 10:01

## 2023-08-01 RX ADMIN — BARIUM SULFATE 450 ML: 20 SUSPENSION ORAL at 10:01

## 2023-08-03 ENCOUNTER — INITIAL CONSULT (OUTPATIENT)
Age: 63
End: 2023-08-03
Payer: COMMERCIAL

## 2023-08-03 DIAGNOSIS — C57.00 FALLOPIAN TUBE CARCINOMA, UNSPECIFIED LATERALITY (HCC): Primary | ICD-10-CM

## 2023-08-03 PROCEDURE — 99214 OFFICE O/P EST MOD 30 MIN: CPT | Performed by: OBSTETRICS & GYNECOLOGY

## 2023-08-03 NOTE — PROGRESS NOTES
Follow up to review ct scan results.
condition of skin 11/17/2005     PMH:  Past Medical History:   Diagnosis Date    Uterine fibroid       PSH:  Past Surgical History:   Procedure Laterality Date    BUNIONECTOMY Right 1977    CARPAL TUNNEL RELEASE Bilateral     COLONOSCOPY      DILATION AND CURETTAGE OF UTERUS      FINGER TRIGGER RELEASE Right     thumb    HYSTERECTOMY (CERVIX STATUS UNKNOWN) N/A 7/7/2023    TOTAL LAPAROSCOPIC HYSTERECTOMY WITH BILATERAL SALPINGO-OOPHORECTOMY, CYSTOSCOPY, DIALATION AND CURETTAGE WITH FROZEN SECTION performed by Milan Fraga MD at [facility] AMBULATORY OR    WISDOM TOOTH EXTRACTION        Social History:  Social History     Tobacco Use    Smoking status: Never     Passive exposure: Never    Smokeless tobacco: Never   Substance Use Topics    Alcohol use: Yes     Comment: twice a week      Family History:  History reviewed. No pertinent family history. Medications: (reviewed)  Current Outpatient Medications   Medication Sig    acetaminophen (AMINOFEN) 325 MG tablet Take 2 tablets by mouth every 4 hours as needed for Pain    ibuprofen (ADVIL;MOTRIN) 600 MG tablet Take 1 tablet by mouth 3 times daily as needed for Pain    rosuvastatin (CRESTOR) 20 MG tablet Take 1 tablet by mouth daily    Cholecalciferol (VITAMIN D3) 50 MCG (2000 UT) CAPS Take 1 capsule by mouth daily     No current facility-administered medications for this visit. Allergies: (reviewed)  No Known Allergies       OBJECTIVE:    Physical Exam:  VITAL SIGNS: There were no vitals filed for this visit. There is no height or weight on file to calculate BMI. GENERAL HUBERT: Conversant, alert, oriented. No acute distress. HEENT: HEENT. No thyroid enlargement. No JVD. Neck: Supple without restrictions. RESPIRATORY: Clear to auscultation and percussion to the bases. No CVAT. CARDIOVASC: RRR without murmur/rub.    GASTROINT: soft, non-tender, without masses or organomegaly   MUSCULOSKEL: no joint tenderness, deformity or swelling   EXTREMITIES: extremities

## 2023-08-14 NOTE — PERIOP NOTE
[address] INSTRUCTIONS    Surgery Date:   08/21/2023    Your surgeon's office or [facility] staff will call you between 4 PM- 8 PM the day before surgery with your arrival time. If your surgery is on a Monday, you will receive a call the preceding Friday. Please report to [facility] Patient Access/Admitting on the 1st floor. Bring your insurance card, photo identification, and any copayment ( if applicable). If you are going home the same day of your surgery, you must have a responsible adult to drive you home. You need to have a responsible adult to stay with you the first 24 hours after surgery and you should not drive a car for 24 hours following your surgery. Nothing to eat or drink after midnight the night before surgery. This includes no water, gum, mints, coffee, juice, etc.  Please note special instructions, if applicable, below for medications. Do NOT drink alcohol or smoke 24 hours before surgery. STOP smoking for 14 days prior as it helps with breathing and healing after surgery. If you are being admitted to the hospital, please leave personal belongings/luggage in your car until you have an assigned hospital room number. Please wear comfortable clothes. Wear your glasses instead of contacts. We ask that all money, jewelry and valuables be left at home. Wear no make up, particularly mascara, the day of surgery. All body piercings, rings, and jewelry need to be removed and left at home. Please remove any nail polish or artificial nails from your fingernails. Please wear your hair loose or down. Please no pony-tails, buns, or any metal hair accessories. If you shower the morning of surgery, please do not apply any lotions or powders afterwards. You may wear deodorant, unless having breast surgery. Do not shave any body area within 24 hours of your surgery. Please follow all instructions to avoid any potential surgical cancellation.   Should your physical condition

## 2023-08-20 ENCOUNTER — ANESTHESIA EVENT (OUTPATIENT)
Facility: HOSPITAL | Age: 63
End: 2023-08-20
Payer: COMMERCIAL

## 2023-08-21 ENCOUNTER — ANESTHESIA (OUTPATIENT)
Facility: HOSPITAL | Age: 63
End: 2023-08-21
Payer: COMMERCIAL

## 2023-08-21 ENCOUNTER — HOSPITAL ENCOUNTER (OUTPATIENT)
Facility: HOSPITAL | Age: 63
Setting detail: OUTPATIENT SURGERY
Discharge: HOME | End: 2023-08-21
Attending: OBSTETRICS & GYNECOLOGY | Admitting: OBSTETRICS & GYNECOLOGY
Payer: COMMERCIAL

## 2023-08-21 VITALS
WEIGHT: 200.6 LBS | SYSTOLIC BLOOD PRESSURE: 111 MMHG | OXYGEN SATURATION: 95 % | DIASTOLIC BLOOD PRESSURE: 63 MMHG | RESPIRATION RATE: 12 BRPM | HEART RATE: 71 BPM | TEMPERATURE: 97.6 F | HEIGHT: 65 IN | BODY MASS INDEX: 33.42 KG/M2

## 2023-08-21 DIAGNOSIS — G89.18 POST-OP PAIN: Primary | ICD-10-CM

## 2023-08-21 PROBLEM — C57.00: Status: ACTIVE | Noted: 2023-08-21

## 2023-08-21 PROCEDURE — 2580000003 HC RX 258: Performed by: NURSE ANESTHETIST, CERTIFIED REGISTERED

## 2023-08-21 PROCEDURE — 7100000000 HC PACU RECOVERY - FIRST 15 MIN: Performed by: OBSTETRICS & GYNECOLOGY

## 2023-08-21 PROCEDURE — 2709999900 HC NON-CHARGEABLE SUPPLY: Performed by: OBSTETRICS & GYNECOLOGY

## 2023-08-21 PROCEDURE — 88307 TISSUE EXAM BY PATHOLOGIST: CPT

## 2023-08-21 PROCEDURE — 88112 CYTOPATH CELL ENHANCE TECH: CPT

## 2023-08-21 PROCEDURE — 6360000002 HC RX W HCPCS: Performed by: NURSE ANESTHETIST, CERTIFIED REGISTERED

## 2023-08-21 PROCEDURE — 3700000000 HC ANESTHESIA ATTENDED CARE: Performed by: OBSTETRICS & GYNECOLOGY

## 2023-08-21 PROCEDURE — 7100000010 HC PHASE II RECOVERY - FIRST 15 MIN: Performed by: OBSTETRICS & GYNECOLOGY

## 2023-08-21 PROCEDURE — 2580000003 HC RX 258: Performed by: STUDENT IN AN ORGANIZED HEALTH CARE EDUCATION/TRAINING PROGRAM

## 2023-08-21 PROCEDURE — 7100000001 HC PACU RECOVERY - ADDTL 15 MIN: Performed by: OBSTETRICS & GYNECOLOGY

## 2023-08-21 PROCEDURE — C9399 UNCLASSIFIED DRUGS OR BIOLOG: HCPCS | Performed by: NURSE ANESTHETIST, CERTIFIED REGISTERED

## 2023-08-21 PROCEDURE — 3600000004 HC SURGERY LEVEL 4 BASE: Performed by: OBSTETRICS & GYNECOLOGY

## 2023-08-21 PROCEDURE — 3600000014 HC SURGERY LEVEL 4 ADDTL 15MIN: Performed by: OBSTETRICS & GYNECOLOGY

## 2023-08-21 PROCEDURE — 6360000002 HC RX W HCPCS: Performed by: OBSTETRICS & GYNECOLOGY

## 2023-08-21 PROCEDURE — 2720000010 HC SURG SUPPLY STERILE: Performed by: OBSTETRICS & GYNECOLOGY

## 2023-08-21 PROCEDURE — 3700000001 HC ADD 15 MINUTES (ANESTHESIA): Performed by: OBSTETRICS & GYNECOLOGY

## 2023-08-21 PROCEDURE — 2500000003 HC RX 250 WO HCPCS: Performed by: NURSE ANESTHETIST, CERTIFIED REGISTERED

## 2023-08-21 PROCEDURE — 6370000000 HC RX 637 (ALT 250 FOR IP): Performed by: STUDENT IN AN ORGANIZED HEALTH CARE EDUCATION/TRAINING PROGRAM

## 2023-08-21 PROCEDURE — 7100000011 HC PHASE II RECOVERY - ADDTL 15 MIN: Performed by: OBSTETRICS & GYNECOLOGY

## 2023-08-21 PROCEDURE — 88305 TISSUE EXAM BY PATHOLOGIST: CPT

## 2023-08-21 RX ORDER — FENTANYL CITRATE 50 UG/ML
100 INJECTION, SOLUTION INTRAMUSCULAR; INTRAVENOUS
Status: DISCONTINUED | OUTPATIENT
Start: 2023-08-21 | End: 2023-08-21 | Stop reason: HOSPADM

## 2023-08-21 RX ORDER — CEFOXITIN 2 G/1
INJECTION, POWDER, FOR SOLUTION INTRAVENOUS PRN
Status: DISCONTINUED | OUTPATIENT
Start: 2023-08-21 | End: 2023-08-21 | Stop reason: SDUPTHER

## 2023-08-21 RX ORDER — HYDROMORPHONE HYDROCHLORIDE 2 MG/ML
INJECTION, SOLUTION INTRAMUSCULAR; INTRAVENOUS; SUBCUTANEOUS PRN
Status: DISCONTINUED | OUTPATIENT
Start: 2023-08-21 | End: 2023-08-21 | Stop reason: SDUPTHER

## 2023-08-21 RX ORDER — FENTANYL CITRATE 50 UG/ML
25 INJECTION, SOLUTION INTRAMUSCULAR; INTRAVENOUS EVERY 5 MIN PRN
Status: DISCONTINUED | OUTPATIENT
Start: 2023-08-21 | End: 2023-08-21 | Stop reason: HOSPADM

## 2023-08-21 RX ORDER — LIDOCAINE HYDROCHLORIDE 20 MG/ML
INJECTION, SOLUTION EPIDURAL; INFILTRATION; INTRACAUDAL; PERINEURAL PRN
Status: DISCONTINUED | OUTPATIENT
Start: 2023-08-21 | End: 2023-08-21 | Stop reason: SDUPTHER

## 2023-08-21 RX ORDER — SODIUM CHLORIDE 0.9 % (FLUSH) 0.9 %
5-40 SYRINGE (ML) INJECTION EVERY 12 HOURS SCHEDULED
Status: DISCONTINUED | OUTPATIENT
Start: 2023-08-21 | End: 2023-08-21 | Stop reason: HOSPADM

## 2023-08-21 RX ORDER — SODIUM CHLORIDE 9 MG/ML
INJECTION, SOLUTION INTRAVENOUS PRN
Status: DISCONTINUED | OUTPATIENT
Start: 2023-08-21 | End: 2023-08-21 | Stop reason: HOSPADM

## 2023-08-21 RX ORDER — 0.9 % SODIUM CHLORIDE 0.9 %
INTRAVENOUS SOLUTION INTRAVENOUS PRN
Status: DISCONTINUED | OUTPATIENT
Start: 2023-08-21 | End: 2023-08-21 | Stop reason: SDUPTHER

## 2023-08-21 RX ORDER — PROCHLORPERAZINE EDISYLATE 5 MG/ML
5 INJECTION INTRAMUSCULAR; INTRAVENOUS
Status: DISCONTINUED | OUTPATIENT
Start: 2023-08-21 | End: 2023-08-21 | Stop reason: HOSPADM

## 2023-08-21 RX ORDER — HYDROMORPHONE HYDROCHLORIDE 1 MG/ML
0.5 INJECTION, SOLUTION INTRAMUSCULAR; INTRAVENOUS; SUBCUTANEOUS EVERY 5 MIN PRN
Status: DISCONTINUED | OUTPATIENT
Start: 2023-08-21 | End: 2023-08-21 | Stop reason: HOSPADM

## 2023-08-21 RX ORDER — DEXAMETHASONE SODIUM PHOSPHATE 4 MG/ML
INJECTION, SOLUTION INTRA-ARTICULAR; INTRALESIONAL; INTRAMUSCULAR; INTRAVENOUS; SOFT TISSUE PRN
Status: DISCONTINUED | OUTPATIENT
Start: 2023-08-21 | End: 2023-08-21 | Stop reason: SDUPTHER

## 2023-08-21 RX ORDER — ROCURONIUM BROMIDE 10 MG/ML
INJECTION, SOLUTION INTRAVENOUS PRN
Status: DISCONTINUED | OUTPATIENT
Start: 2023-08-21 | End: 2023-08-21 | Stop reason: SDUPTHER

## 2023-08-21 RX ORDER — OXYCODONE HYDROCHLORIDE 5 MG/1
5 TABLET ORAL
Status: DISCONTINUED | OUTPATIENT
Start: 2023-08-21 | End: 2023-08-21 | Stop reason: HOSPADM

## 2023-08-21 RX ORDER — HYDRALAZINE HYDROCHLORIDE 20 MG/ML
10 INJECTION INTRAMUSCULAR; INTRAVENOUS
Status: DISCONTINUED | OUTPATIENT
Start: 2023-08-21 | End: 2023-08-21 | Stop reason: HOSPADM

## 2023-08-21 RX ORDER — SODIUM CHLORIDE 0.9 % (FLUSH) 0.9 %
5-40 SYRINGE (ML) INJECTION PRN
Status: DISCONTINUED | OUTPATIENT
Start: 2023-08-21 | End: 2023-08-21 | Stop reason: HOSPADM

## 2023-08-21 RX ORDER — PHENYLEPHRINE HCL IN 0.9% NACL 0.4MG/10ML
SYRINGE (ML) INTRAVENOUS PRN
Status: DISCONTINUED | OUTPATIENT
Start: 2023-08-21 | End: 2023-08-21 | Stop reason: SDUPTHER

## 2023-08-21 RX ORDER — FENTANYL CITRATE 50 UG/ML
INJECTION, SOLUTION INTRAMUSCULAR; INTRAVENOUS PRN
Status: DISCONTINUED | OUTPATIENT
Start: 2023-08-21 | End: 2023-08-21 | Stop reason: SDUPTHER

## 2023-08-21 RX ORDER — PROPOFOL 10 MG/ML
INJECTION, EMULSION INTRAVENOUS PRN
Status: DISCONTINUED | OUTPATIENT
Start: 2023-08-21 | End: 2023-08-21 | Stop reason: SDUPTHER

## 2023-08-21 RX ORDER — TRAMADOL HYDROCHLORIDE 50 MG/1
50 TABLET ORAL EVERY 4 HOURS PRN
Qty: 30 TABLET | Refills: 0 | Status: SHIPPED | OUTPATIENT
Start: 2023-08-21 | End: 2023-08-26

## 2023-08-21 RX ORDER — BUPIVACAINE HYDROCHLORIDE 5 MG/ML
INJECTION, SOLUTION EPIDURAL; INTRACAUDAL PRN
Status: DISCONTINUED | OUTPATIENT
Start: 2023-08-21 | End: 2023-08-21 | Stop reason: HOSPADM

## 2023-08-21 RX ORDER — METRONIDAZOLE 500 MG/100ML
INJECTION, SOLUTION INTRAVENOUS PRN
Status: DISCONTINUED | OUTPATIENT
Start: 2023-08-21 | End: 2023-08-21 | Stop reason: SDUPTHER

## 2023-08-21 RX ORDER — LIDOCAINE HYDROCHLORIDE 10 MG/ML
1 INJECTION, SOLUTION EPIDURAL; INFILTRATION; INTRACAUDAL; PERINEURAL
Status: DISCONTINUED | OUTPATIENT
Start: 2023-08-21 | End: 2023-08-21 | Stop reason: HOSPADM

## 2023-08-21 RX ORDER — MIDAZOLAM HYDROCHLORIDE 1 MG/ML
INJECTION INTRAMUSCULAR; INTRAVENOUS PRN
Status: DISCONTINUED | OUTPATIENT
Start: 2023-08-21 | End: 2023-08-21 | Stop reason: SDUPTHER

## 2023-08-21 RX ORDER — MIDAZOLAM HYDROCHLORIDE 2 MG/2ML
2 INJECTION, SOLUTION INTRAMUSCULAR; INTRAVENOUS
Status: DISCONTINUED | OUTPATIENT
Start: 2023-08-21 | End: 2023-08-21 | Stop reason: HOSPADM

## 2023-08-21 RX ORDER — ONDANSETRON 2 MG/ML
4 INJECTION INTRAMUSCULAR; INTRAVENOUS
Status: DISCONTINUED | OUTPATIENT
Start: 2023-08-21 | End: 2023-08-21 | Stop reason: HOSPADM

## 2023-08-21 RX ORDER — GLYCOPYRROLATE 0.2 MG/ML
INJECTION INTRAMUSCULAR; INTRAVENOUS PRN
Status: DISCONTINUED | OUTPATIENT
Start: 2023-08-21 | End: 2023-08-21 | Stop reason: SDUPTHER

## 2023-08-21 RX ORDER — SODIUM CHLORIDE, SODIUM LACTATE, POTASSIUM CHLORIDE, CALCIUM CHLORIDE 600; 310; 30; 20 MG/100ML; MG/100ML; MG/100ML; MG/100ML
INJECTION, SOLUTION INTRAVENOUS CONTINUOUS
Status: DISCONTINUED | OUTPATIENT
Start: 2023-08-21 | End: 2023-08-21 | Stop reason: HOSPADM

## 2023-08-21 RX ORDER — ACETAMINOPHEN 500 MG
1000 TABLET ORAL ONCE
Status: COMPLETED | OUTPATIENT
Start: 2023-08-21 | End: 2023-08-21

## 2023-08-21 RX ADMIN — Medication 80 MCG: at 08:53

## 2023-08-21 RX ADMIN — SODIUM CHLORIDE 1000 ML: 9 INJECTION, SOLUTION INTRAVENOUS at 09:09

## 2023-08-21 RX ADMIN — ROCURONIUM BROMIDE 50 MG: 10 SOLUTION INTRAVENOUS at 07:36

## 2023-08-21 RX ADMIN — SUGAMMADEX 120 MG: 100 INJECTION, SOLUTION INTRAVENOUS at 09:23

## 2023-08-21 RX ADMIN — CEFOXITIN SODIUM 2000 MG: 2 POWDER, FOR SOLUTION INTRAVENOUS at 07:58

## 2023-08-21 RX ADMIN — Medication 80 MCG: at 08:57

## 2023-08-21 RX ADMIN — FENTANYL CITRATE 100 MCG: 0.05 INJECTION, SOLUTION INTRAMUSCULAR; INTRAVENOUS at 07:34

## 2023-08-21 RX ADMIN — MIDAZOLAM 1 MG: 1 INJECTION INTRAMUSCULAR; INTRAVENOUS at 07:30

## 2023-08-21 RX ADMIN — GLYCOPYRROLATE 0.3 MG: 0.2 INJECTION INTRAMUSCULAR; INTRAVENOUS at 08:06

## 2023-08-21 RX ADMIN — Medication 80 MCG: at 08:56

## 2023-08-21 RX ADMIN — ROCURONIUM BROMIDE 10 MG: 10 SOLUTION INTRAVENOUS at 09:03

## 2023-08-21 RX ADMIN — METRONIDAZOLE 500 MG: 5 INJECTION, SOLUTION INTRAVENOUS at 07:47

## 2023-08-21 RX ADMIN — DEXAMETHASONE SODIUM PHOSPHATE 6 MG: 4 INJECTION, SOLUTION INTRAMUSCULAR; INTRAVENOUS at 08:05

## 2023-08-21 RX ADMIN — MIDAZOLAM 1 MG: 1 INJECTION INTRAMUSCULAR; INTRAVENOUS at 07:22

## 2023-08-21 RX ADMIN — ACETAMINOPHEN 1000 MG: 500 TABLET ORAL at 07:04

## 2023-08-21 RX ADMIN — FENTANYL CITRATE 100 MCG: 0.05 INJECTION, SOLUTION INTRAMUSCULAR; INTRAVENOUS at 07:57

## 2023-08-21 RX ADMIN — ROCURONIUM BROMIDE 20 MG: 10 SOLUTION INTRAVENOUS at 08:25

## 2023-08-21 RX ADMIN — SODIUM CHLORIDE, POTASSIUM CHLORIDE, SODIUM LACTATE AND CALCIUM CHLORIDE: 600; 310; 30; 20 INJECTION, SOLUTION INTRAVENOUS at 07:03

## 2023-08-21 RX ADMIN — LIDOCAINE HYDROCHLORIDE 50 MG: 20 INJECTION, SOLUTION EPIDURAL; INFILTRATION; INTRACAUDAL; PERINEURAL at 07:35

## 2023-08-21 RX ADMIN — PROPOFOL 150 MG: 10 INJECTION, EMULSION INTRAVENOUS at 07:35

## 2023-08-21 RX ADMIN — HYDROMORPHONE HYDROCHLORIDE 1 MG: 2 INJECTION, SOLUTION INTRAMUSCULAR; INTRAVENOUS; SUBCUTANEOUS at 09:20

## 2023-08-21 ASSESSMENT — PAIN - FUNCTIONAL ASSESSMENT: PAIN_FUNCTIONAL_ASSESSMENT: NONE - DENIES PAIN

## 2023-08-21 NOTE — ANESTHESIA POSTPROCEDURE EVALUATION
Department of Anesthesiology  Postprocedure Note    Patient: Fred Jeffers  MRN: [id number]  YOB: 1960  Date of evaluation: 8/21/2023      Procedure Summary     Date: 08/21/23 Room / Location: [facility] MAIN OR [address] [place] [address] MAIN OR    Anesthesia Start: 0722 Anesthesia Stop: 7709    Procedure: DIAGNOSTIC LAPAROSCOPY, PERITONEAL BIOPSY, OMENTAL BIOPSY, STAGING (Pelvis) Diagnosis:       Carcinoma of fallopian tube, unspecified laterality ([address])      (Carcinoma of fallopian tube, unspecified laterality ([address]) [C57.00])    Providers: Vikram Valdivia MD Responsible Provider: Zeb Vargas DO    Anesthesia Type: General ASA Status: 2          Anesthesia Type: General    Angeles Phase I: Angeles Score: 10    Angeles Phase II:        Anesthesia Post Evaluation    Patient location during evaluation: bedside  Patient participation: complete - patient participated  Level of consciousness: awake and alert  Pain score: 1  Airway patency: patent  Nausea & Vomiting: no vomiting and no nausea  Complications: no  Cardiovascular status: blood pressure returned to baseline and hemodynamically stable  Respiratory status: acceptable and room air  Hydration status: euvolemic  Multimodal analgesia pain management approach  Pain management: adequate

## 2023-08-21 NOTE — OP NOTE
Gynecologic Oncology Operative Report    Aleksey Valles  8/21/2023    Pre-operative dx: Fallopian tube carcinoma    Post-operative dx: Fallopian tube carcinoma    Procedure:  Diagnostic laparoscopy, resection of bilateral infundibulopelvic ligament remnants, peritoneal staging biopsies, bilateral paraaortic lymphadenectomy, omentectomy    Surgeon:  Imelda Yoon MD    Assistant:  Eliecer Valdes PA-C (Assistance was required due to the difficulty of the procedure.   They actively assisted with the necessary dissection and proper identification of relevant anatomy throughout the course of the procedure.)     Anesthesia:  GETA    EBL:  25 cc    Complications:  None    Specimens:    ID Type Source Tests Collected by Time Destination   A : PELVIC ADHESIONS Tissue Abdomen SURGICAL PATHOLOGY Viola Casiano MD 8/21/2023 0815     B : PELVIC WASHINGS Body Fluid Pelvic Washings CYTOLOGY, NON-GYN Viola Casiano MD 8/21/2023 0815     C : LEFT OVARIAN VASCULAR REMNANT & LEFT PELVIC SIDEWALL PERITONEUM Tissue Abdomen SURGICAL PATHOLOGY Viola Casiano MD 8/21/2023 2156     D : RIGHT PARA COLIC GUTTER Tissue Abdomen SURGICAL PATHOLOGY Viola Casiano MD 8/21/2023 3682     E : RIGHT OVARIAN VASCULAR REMNANT & RIGHT PELVIC SIDEWALL PERITONEUM Tissue Abdomen SURGICAL PATHOLOGY Viola Casiano MD 8/21/2023 0831     F : RIGHT PARA  AORTIC LYMPH NODE Tissue Abdomen SURGICAL PATHOLOGY Viola Casiano MD 8/21/2023 7261     G : LEFT PARA COLIC GUTTER Tissue Abdomen SURGICAL PATHOLOGY Viola Casiano MD 8/21/2023 5055     H : LEFT PARA AORTIC LYMPH NODE Tissue Abdomen SURGICAL PATHOLOGY Viola Casiano MD 8/21/2023 0768     I : Nancy Moody MD 8/21/2023 0848     J : Elena Dodson MD 8/21/2023 1232     K : OMENTUM Tissue Abdomen SURGICAL PATHOLOGY Viola Casiano MD 8/21/2023 9731       Implants: and retracted laterally. The nodes were identified. A sampling was performed with a combination of sharp and blunt dissection using the Harmonic Scalpel. I then moved to the left side. The ureter was mobilized and retracted laterally. The nodes were identified. A sampling was performed with a combination of sharp and blunt dissection using the Harmonic Scalpel. I then proceed with an infracolic omentectomy. She was taken out of Trendelenburg position and placed in reverse T-cm. The omentum was grasped and elevated. A window was created in the omentum just distal to the transverse colon in the midline. Working our way toward the right side of the patient, the omentum was divided along the distal border of the transverse colon using the Harmonic Scalpel. We then divided the omentum with the Harmonic along the distal border of the transverse colon toward the left side of the patient until the omentum was completely freed. Once freed, it was placed in a large Endocatch bag and delivered through the 12 mm trocar site. The 12 mm site was then closed at the level of the fascia with a 0 Vicryl suture using the Will-Shakira closure device. She was then placed back in to Trendelenburg position. The pelvis was then irrigated and all pedicles inspected. Surgicel powder was then applied to the areas of pelvic dissection. Once satisfied with hemostasis, the gas was then allowed to escape from the abdomen and the remaining trocars were removed. She was then taken out of Trendelenburg tilt. The incision sites were closed with a stitch of 4-0 Monocryl, followed by a layer of Dermabond. The sponge stick was then removed from the vagina and the grimm was removed. The patient was taken out of stirrups, awakened from anesthesia, and taken to the recovery room in stable condition. All instrument, sponge, and needle counts were correct.       Harlee Goldberg, MD  8/21/2023  9:24 AM

## 2023-08-21 NOTE — PERIOP NOTE
Family (ROMARIO) called and informed of patient's progress AT [address] PRN USE BY SURGEON  REF [id number]  LOT [id number]  EXP 09/30/24, 11/30/24

## 2023-08-21 NOTE — H&P
[address] [place] [address] Mary Toscano  P [phone]  F [phone]    Office Note  Patient ID:  Name:  Terrace Jeans  MRN:  [id number]  :  1960/63 y.o. Date:  2023      HISTORY OF PRESENT ILLNESS:  Terrace Jeans is a 59 y.o.  postmenopausal female who is a newly established patient being seen for an occult STIC identified following a hysterectomy for presumed benign indications. She underwent TLH, BSO with Dr. Kelly Ferrari with [facility] on 23 for postmenopausal bleeding and fibroids. She had previously had a cervical polyp removed with benign histology. 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 FINAL PATHOLOGIC DIAGNOSIS   1. Endometrium, curetting:   Scant fragments of benign glandular epithelium   2. Uterus, bilateral ovaries and fallopian tubes, hysterectomy/bilateral salpingo-oophorectomy:   Cervix: Focal vasculitis. See comment   Endometrium: Cystic atrophy   Myometrium: Leiomyomas and adenomyosis   Serosa: Fibrous adhesions   Ovaries, bilateral: Serous cystadenofibroma and physiologic changes   Fallopian tubes, bilateral: Focal serous tubal intraepithelial carcinoma (STIC). See comment   Comment   Representative sections of fallopian tubes demonstrate a focus of significant cytologic atypia which is positive for p53 overexpression. Features are consistent with a focus of serous tubal intraepithelial carcinoma. The remainder of the fallopian tube tissue will be submitted for histologic evaluation. Results of these additional sections will be reported in an addendum   Regarding the cervix, focal vasculitis may be an isolated nonspecific finding in the GYN tract or may be part of a systemic vasculitis.  Clinical correlation is necessary   Addendum Diagnosis   No additional lesions or atypia identified in the additional sections of fallopian tube thickening. COLON: No dilatation or wall thickening. APPENDIX: Not seen  PERITONEUM: No ascites or pneumoperitoneum. RETROPERITONEUM: No lymphadenopathy or aortic aneurysm. REPRODUCTIVE ORGANS: Total abdominal hysterectomy. URINARY BLADDER: No mass or calculus. BONES: No destructive bone lesion. ABDOMINAL WALL: No mass or hernia. ADDITIONAL COMMENTS: N/A     IMPRESSION:  1.  2 mm left lower lobe lung nodule of doubtful clinical significance. 2.  Hepatic lesions; at least 2 of them representing hemangiomas while the  third, subcentimeter lesion, is indeterminate. 3.  No peritoneal nodularity, lymphadenopathy, or ascites. IMPRESSION/PLAN:  Babita Chen is a 59 y.o. female with a diagnosis of focal serous tubal intraepithelial carcinoma (STIC). I again reviewed with Babita Chen her medical records, physical exam, and review of symptoms. This was truly an unexpected finding on pathology. I again discussed with Mrs. Alonso Steele that the management of STICs are not well-established and can range from observation to adjuvant chemotherapy. Her CA-125 is within the normal range and her CT does not suggest residual disease. I personally reviewed the CT images with her and explained the findings. I recommended that we look in laparoscopically and perform complete staging, in order to make sure that there is no remaining occult disease, which would change her stage, and thus prognosis and recommendations on adjuvant therapy. At the time of laparoscopy we will collect pelvic washings, evaluate the peritoneal cavity and obtain biopsies, and perform a partial omentectomy. I will also plan to resect the distal portions of the ovarian vessels to assess for occult disease there. I will evaluated the nodes bilaterally and remove any enlarged nodes. Since I don't know the laterality of the lesion, I don't know which nodes are at greatest risk.   She was counseled on the risks, benefits, indications and

## 2023-08-21 NOTE — BRIEF OP NOTE
Brief Postoperative Note      Patient: Katie Adler  YOB: 1960  MRN: [id number]    Date of Procedure: 8/21/2023    Pre-Op Diagnosis Codes:     * Carcinoma of fallopian tube, unspecified laterality ([address]) [C57.00]    Post-Op Diagnosis: Post-Op Diagnosis Codes:     * Carcinoma of fallopian tube, unspecified laterality ([address]) [C57.00]       Procedure(s):  DIAGNOSTIC LAPAROSCOPY, PERITONEAL BIOPSY, OMENTAL BIOPSY, STAGING    Surgeon(s):  Richar Delarosa MD    Assistant:  * No surgical staff found *    Anesthesia: General    Estimated Blood Loss (mL): less than 50     Complications: None    Specimens:   ID Type Source Tests Collected by Time Destination   A : PELVIC ADHESIONS Tissue Abdomen SURGICAL PATHOLOGY Richar Delarosa MD 8/21/2023 0815    B : PELVIC WASHINGS Body Fluid Pelvic Washings CYTOLOGY, NON-GYN Richar Delarosa MD 8/21/2023 0815    C : LEFT OVARIAN VASCULAR REMNANT & LEFT PELVIC SIDEWALL PERITONEUM Tissue Abdomen SURGICAL PATHOLOGY Richar Delarosa MD 8/21/2023 8496    D : RIGHT PARA COLIC GUTTER Tissue Abdomen SURGICAL PATHOLOGY Richar Delarosa MD 8/21/2023 1767    E : RIGHT OVARIAN VASCULAR REMNANT & RIGHT PELVIC SIDEWALL PERITONEUM Tissue Abdomen SURGICAL PATHOLOGY Richar Delarosa MD 8/21/2023 0831    F : RIGHT PARA  AORTIC LYMPH NODE Tissue Abdomen SURGICAL PATHOLOGY Richar Delarosa MD 8/21/2023 9817    G : LEFT PARA COLIC GUTTER Tissue Abdomen SURGICAL PATHOLOGY Richar Delarosa MD 8/21/2023 8975    H : LEFT PARA AORTIC LYMPH NODE Tissue Abdomen SURGICAL PATHOLOGY Richar Delarosa MD 8/21/2023 5762    I : Nikkie Spikes PERITONEUM Tissue Abdomen SURGICAL PATHOLOGY Richar Delarosa MD 8/21/2023 0848    J : BLADDER PERITONEUM Tissue Abdomen SURGICAL PATHOLOGY Richar Delarosa MD 8/21/2023 1679    K : OMENTUM Tissue Abdomen SURGICAL PATHOLOGY Richar Delarosa MD 8/21/2023 9434        Implants:  * No implants in log *      Drains:   [REMOVED] Urinary Catheter 08/21/23 2 Way (Removed)

## 2023-08-21 NOTE — DISCHARGE INSTRUCTIONS
______________________________________________________________________    Anesthesia Discharge Instructions    After general anesthesia or intervenous sedation, for 24 hours or while taking prescription Narcotics:  Limit your activities  Do not drive or operate hazardous machinery  If you have not urinated within 8 hours after discharge, please contact your surgeon on call. Do not make important personal or business decisions  Do not drink alcoholic beverages    Report the following to your surgeon:  Excessive pain, swelling, redness or odor of or around the surgical area  Temperature over 100.5 degrees  Nausea and vomiting lasting longer than 4 hours or if unable to take medication  Any signs of decreased circulation or nerve impairment to extremity:  Change in color, persistent numbness, tingling, coldness or increased pain.   Any questions  '

## 2023-09-05 ENCOUNTER — OFFICE VISIT (OUTPATIENT)
Age: 63
End: 2023-09-05

## 2023-09-05 VITALS
HEART RATE: 67 BPM | WEIGHT: 202 LBS | HEIGHT: 65 IN | SYSTOLIC BLOOD PRESSURE: 104 MMHG | DIASTOLIC BLOOD PRESSURE: 72 MMHG | BODY MASS INDEX: 33.66 KG/M2

## 2023-09-05 DIAGNOSIS — C57.00 FALLOPIAN TUBE CARCINOMA, UNSPECIFIED LATERALITY (HCC): Primary | ICD-10-CM

## 2023-09-05 PROCEDURE — 99024 POSTOP FOLLOW-UP VISIT: CPT | Performed by: OBSTETRICS & GYNECOLOGY

## 2023-09-05 NOTE — PROGRESS NOTES
Post operative follow up.
lymphadenopathy, or ascites. IMPRESSION/PLAN:  Kurtis Crowley is a 59 y.o. female with a diagnosis of focal serous tubal intraepithelial carcinoma (STIC). This was truly an unexpected finding on pathology at the time of hysterectomy. I recommended a laparoscopic staging procedure to make sure that there was no remaining occult disease, which would change her stage, and thus prognosis and recommendations on adjuvant therapy. All of the pathology from her staging procedure showed no evidence of residual disease, making her final pathologic stage IA. Based upon the reassuring pathology, she does not need additional treatment or evaluation at this time. I discussed with her the rationale for and schedule of surveillance. I will see her back in 3-6 months for her first surveillance visit. She was instructed to call and schedule an appointment sooner if she has any new or concerning abdominal or pelvic symptoms. An electronic signature was used to sign this note.     Debbie Jefferson MD  09/05/23

## 2024-03-04 NOTE — PROGRESS NOTES
Six month check up for history of fallopian tube cancer. Pt states no abnormal spotting, bleeding or pain.   1. Have you been to the ER, urgent care clinic since your last visit?  Hospitalized since your last visit?no  2. Have you seen or consulted any other health care providers outside of the [facility] since your last visit?  Include any pap smears or colon screening. no

## 2024-03-05 ENCOUNTER — OFFICE VISIT (OUTPATIENT)
Age: 64
End: 2024-03-05
Payer: COMMERCIAL

## 2024-03-05 VITALS
DIASTOLIC BLOOD PRESSURE: 76 MMHG | HEART RATE: 74 BPM | HEIGHT: 65 IN | BODY MASS INDEX: 34.99 KG/M2 | SYSTOLIC BLOOD PRESSURE: 113 MMHG | WEIGHT: 210 LBS

## 2024-03-05 DIAGNOSIS — C57.00 FALLOPIAN TUBE CARCINOMA, UNSPECIFIED LATERALITY (HCC): Primary | ICD-10-CM

## 2024-03-05 PROCEDURE — 99212 OFFICE O/P EST SF 10 MIN: CPT | Performed by: OBSTETRICS & GYNECOLOGY

## 2024-03-05 NOTE — PROGRESS NOTES
[facility] GYNECOLOGIC ONCOLOGY  [address] [place] [address], [address]  [place] [address]  P [phone]  F [phone]    Office Note  Patient ID:  Name:  Anastasiia Ford  MRN:  [id number]  :  1960/63 y.o.  Date:  3/5/2024      HISTORY OF PRESENT ILLNESS:  Anastasiia Ford is a 63 y.o.  postmenopausal female who is an established patient referred for an occult STIC identified following a hysterectomy for presumed benign indications.  She underwent TLH, BSO with Dr. Katarina Osborne with [facility] on 23 for postmenopausal bleeding and fibroids.  She had previously had a cervical polyp removed with benign histology.  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      FINAL PATHOLOGIC DIAGNOSIS   1. Endometrium, curetting:   Scant fragments of benign glandular epithelium   2. Uterus, bilateral ovaries and fallopian tubes, hysterectomy/bilateral salpingo-oophorectomy:   Cervix: Focal vasculitis. See comment   Endometrium: Cystic atrophy   Myometrium: Leiomyomas and adenomyosis   Serosa: Fibrous adhesions   Ovaries, bilateral: Serous cystadenofibroma and physiologic changes   Fallopian tubes, bilateral: Focal serous tubal intraepithelial carcinoma (STIC). See comment   Comment   Representative sections of fallopian tubes demonstrate a focus of significant cytologic atypia which is positive for p53 overexpression. Features are consistent with a focus of serous tubal intraepithelial carcinoma. The remainder of the fallopian tube tissue will be submitted for histologic evaluation. Results of these additional sections will be reported in an addendum   Regarding the cervix, focal vasculitis may be an isolated nonspecific finding in the GYN tract or may be part of a systemic vasculitis. Clinical correlation is necessary   Addendum Diagnosis   No additional lesions or atypia identified in the additional sections of fallopian tube   Addendum

## 2024-03-06 LAB — CANCER AG125 SERPL-ACNC: 14.8 U/ML (ref 0–38.1)

## 2024-03-19 LAB — CANCER AG125 SERPL-ACNC: 14.8 U/ML (ref 0–38.1)

## 2024-09-19 ENCOUNTER — OFFICE VISIT (OUTPATIENT)
Age: 64
End: 2024-09-19
Payer: COMMERCIAL

## 2024-09-19 VITALS
HEIGHT: 65 IN | DIASTOLIC BLOOD PRESSURE: 73 MMHG | SYSTOLIC BLOOD PRESSURE: 106 MMHG | HEART RATE: 75 BPM | BODY MASS INDEX: 34.99 KG/M2 | WEIGHT: 210 LBS

## 2024-09-19 DIAGNOSIS — C57.00 FALLOPIAN TUBE CARCINOMA, UNSPECIFIED LATERALITY (HCC): Primary | ICD-10-CM

## 2024-09-19 PROCEDURE — 99212 OFFICE O/P EST SF 10 MIN: CPT | Performed by: OBSTETRICS & GYNECOLOGY

## 2024-09-20 LAB — CANCER AG125 SERPL-ACNC: 15 U/ML (ref 0–38.1)

## 2024-09-23 ENCOUNTER — CLINICAL DOCUMENTATION (OUTPATIENT)
Age: 64
End: 2024-09-23

## (undated) DEVICE — GYN LAPAROSCOPY - SMH: Brand: MEDLINE INDUSTRIES, INC.

## (undated) DEVICE — SYSTEM EVAC SMOKE LAPARSCOPIC

## (undated) DEVICE — PAD PT POS 36 IN SURGYPAD DISP

## (undated) DEVICE — AGENT HEMSTAT 3GM OXIDIZED REGENERATED CELOS ABSRB FOR CONT (ORDER MULTIPLES OF 5EA)

## (undated) DEVICE — HOOK ENDOSCP L4-9CM TIP FOR SEAL AND DISECT HARM

## (undated) DEVICE — HYPODERMIC SAFETY NEEDLE: Brand: MONOJECT

## (undated) DEVICE — TROCAR ENDOSCP L100MM DIA5MM BLDELSS STBL SL OBT RADLUC

## (undated) DEVICE — SYRINGE MED 10ML LUERLOCK TIP W/O SFTY DISP

## (undated) DEVICE — SUTURE MCRYL SZ 4-0 L27IN ABSRB UD L19MM PS-2 1/2 CIR PRIM Y426H

## (undated) DEVICE — NEEDLE INSUFFLATION 14 GAX120 MM SURGINEEDLE

## (undated) DEVICE — SOLUTION IRRIG 3000ML 0.9% SOD CHL USP UROMATIC PLAS CONT

## (undated) DEVICE — 1LYRTR 16FR10ML100%SIL UMS SNP: Brand: MEDLINE INDUSTRIES, INC.

## (undated) DEVICE — TROCAR: Brand: KII FIOS FIRST ENTRY

## (undated) DEVICE — GARMENT,MEDLINE,DVT,INT,CALF,MED, GEN2: Brand: MEDLINE

## (undated) DEVICE — TROCAR ENDOSCP L100MM DIA5MM BLDELSS STBL SL THRD OPT VW

## (undated) DEVICE — GLOVE ORANGE PI 7   MSG9070

## (undated) DEVICE — ELECTRODE ES 36CM LAP FLAT L HK COAT DISP CLEANCOAT

## (undated) DEVICE — KIT,ANTI FOG,W/SPONGE & FLUID,SOFT PACK: Brand: MEDLINE

## (undated) DEVICE — SOLUTION IV 1000ML 0.9% SOD CHL PH 5 INJ USP VIAFLX PLAS

## (undated) DEVICE — SUTURE SZ 0 27IN 5/8 CIR UR-6  TAPER PT VIOLET ABSRB VICRYL J603H

## (undated) DEVICE — COVER LT HNDL PLAS RIG 1 PER PK

## (undated) DEVICE — SUTURE V-LOC 180 SZ 0 L12IN ABSRB GRN L37MM GS-21 1/2 CIR VLOCL0316

## (undated) DEVICE — SHEARS ENDOSCP L36CM DIA5MM ULTRASONIC CRV TIP W/ ADV

## (undated) DEVICE — TROCAR ENDOSCP L100MM DIA12MM BLDELSS OBT RADLUC STBL SL

## (undated) DEVICE — GLOVE SURG SZ 65 THK91MIL LTX FREE SYN POLYISOPRENE

## (undated) DEVICE — GLOVE SURG SZ 7 L12IN FNGR THK79MIL GRN LTX FREE

## (undated) DEVICE — GYN LAPAROSCOPY-MRMC: Brand: MEDLINE INDUSTRIES, INC.

## (undated) DEVICE — KIT INCLUDES: GTB17, ALEXIS CONTAINED EXTRACTION SYSTEM C0R47, 12X100 KII BALLOON BLUNT TIP TROCAR: Brand: ALEXIS CONTAINED EXTRACTION SYSTEM WITH KII BALLOON BLUNT TIP SYSTEM

## (undated) DEVICE — TISSUE RETRIEVAL SYSTEM: Brand: INZII RETRIEVAL SYSTEM

## (undated) DEVICE — VCARE MEDIUM, UTERINE MANIPULATOR, VAGINAL-CERVICAL-AHLUWALIA'S-RETRACTOR-ELEVATOR: Brand: VCARE

## (undated) DEVICE — GLOVE SURG SZ 75 L1212IN FNGR THK138MIL BRN LTX FREE

## (undated) DEVICE — LIQUIBAND RAPID ADHESIVE 36/CS 0.8ML: Brand: MEDLINE

## (undated) DEVICE — TROCAR: Brand: KII SLEEVE

## (undated) DEVICE — GLOVE SURG SZ 8 L12IN FNGR THK94MIL STD WHT LTX FREE

## (undated) DEVICE — TUBING, SUCTION, 1/4" X 10', STRAIGHT: Brand: MEDLINE

## (undated) DEVICE — SURGICEL ENDOSCP APPL